# Patient Record
Sex: MALE | Race: OTHER | Employment: FULL TIME | URBAN - METROPOLITAN AREA
[De-identification: names, ages, dates, MRNs, and addresses within clinical notes are randomized per-mention and may not be internally consistent; named-entity substitution may affect disease eponyms.]

---

## 2017-01-18 ENCOUNTER — HOSPITAL ENCOUNTER (OUTPATIENT)
Age: 27
Discharge: HOME | End: 2017-01-18
Attending: EMERGENCY MEDICINE
Payer: COMMERCIAL

## 2017-01-18 VITALS
HEART RATE: 122 BPM | OXYGEN SATURATION: 96 % | TEMPERATURE: 101 F | DIASTOLIC BLOOD PRESSURE: 76 MMHG | HEIGHT: 67 IN | WEIGHT: 250 LBS | BODY MASS INDEX: 39.24 KG/M2 | SYSTOLIC BLOOD PRESSURE: 145 MMHG | RESPIRATION RATE: 18 BRPM

## 2017-01-18 DIAGNOSIS — J11.1 INFLUENZA: Primary | ICD-10-CM

## 2017-01-18 PROCEDURE — 99203 OFFICE O/P NEW LOW 30 MIN: CPT

## 2017-01-18 PROCEDURE — 99204 OFFICE O/P NEW MOD 45 MIN: CPT

## 2017-01-18 RX ORDER — BENZONATATE 200 MG/1
200 CAPSULE ORAL 3 TIMES DAILY PRN
Qty: 15 CAPSULE | Refills: 0 | Status: SHIPPED | OUTPATIENT
Start: 2017-01-18 | End: 2017-01-23

## 2017-01-18 RX ORDER — IBUPROFEN 600 MG/1
600 TABLET ORAL ONCE
Status: COMPLETED | OUTPATIENT
Start: 2017-01-18 | End: 2017-01-18

## 2017-01-18 RX ORDER — OSELTAMIVIR PHOSPHATE 75 MG/1
75 CAPSULE ORAL 2 TIMES DAILY
Qty: 10 CAPSULE | Refills: 0 | Status: SHIPPED | OUTPATIENT
Start: 2017-01-18 | End: 2017-01-23

## 2017-01-19 NOTE — ED NOTES
Temp 101. Motrin given on arival no to go to work tomorrow motrin every 6 hours rest wash hands cover your mouth with coughing. Go to the ed for chest pain shortness of breath increase coughing new or worse complaints.  No work for 24 hours after fever rody

## 2017-01-19 NOTE — ED PROVIDER NOTES
Patient Seen in: [facility] Immediate [facility] In [address] [place] [address]    History   Patient presents with:  Cough/URI    Stated Complaint: coughing, chest pain, vomiting, chills    HPI    Yesterday evening started with fevers, body aches, cough, headache scratchy external ear normal.   Left Ear: Tympanic membrane and external ear normal.   Nose: Nose normal.   Mouth/Throat: Uvula is midline, oropharynx is clear and moist and mucous membranes are normal.   Eyes: Conjunctivae and EOM are normal.   Neck: Neck supple.

## 2017-03-13 ENCOUNTER — HOSPITAL ENCOUNTER (OUTPATIENT)
Age: 27
Discharge: HOME | End: 2017-03-13
Attending: EMERGENCY MEDICINE
Payer: COMMERCIAL

## 2017-03-13 VITALS
WEIGHT: 250 LBS | HEIGHT: 67 IN | BODY MASS INDEX: 39.24 KG/M2 | HEART RATE: 85 BPM | DIASTOLIC BLOOD PRESSURE: 78 MMHG | RESPIRATION RATE: 16 BRPM | TEMPERATURE: 98 F | SYSTOLIC BLOOD PRESSURE: 127 MMHG | OXYGEN SATURATION: 98 %

## 2017-03-13 DIAGNOSIS — S61.219A FINGER LACERATION, INITIAL ENCOUNTER: Primary | ICD-10-CM

## 2017-03-13 PROCEDURE — 99212 OFFICE O/P EST SF 10 MIN: CPT

## 2017-03-13 NOTE — ED PROVIDER NOTES
Patient Seen in: [facility] Immediate Care In [address] [place] [address]    History   Patient presents with:  Upper Extremity Injury (musculoskeletal)    Stated Complaint: laceration     HPI  Patient sustained a laceration to his right fourth finger shortly prior t Pulmonary/Chest: Effort normal. No stridor. Abdominal: There is no tenderness. Musculoskeletal: Normal range of motion. He exhibits no edema. Right hand: He exhibits laceration.  He exhibits normal range of motion, no bony tenderness, normal two-

## 2017-08-01 ENCOUNTER — HOSPITAL ENCOUNTER (OUTPATIENT)
Age: 27
Discharge: HOME | End: 2017-08-01
Attending: PEDIATRICS
Payer: COMMERCIAL

## 2017-08-01 VITALS
RESPIRATION RATE: 18 BRPM | SYSTOLIC BLOOD PRESSURE: 130 MMHG | HEART RATE: 72 BPM | TEMPERATURE: 99 F | OXYGEN SATURATION: 98 % | DIASTOLIC BLOOD PRESSURE: 91 MMHG

## 2017-08-01 DIAGNOSIS — S86.111A GASTROCNEMIUS MUSCLE TEAR, RIGHT, INITIAL ENCOUNTER: Primary | ICD-10-CM

## 2017-08-01 PROCEDURE — 99213 OFFICE O/P EST LOW 20 MIN: CPT

## 2017-08-01 PROCEDURE — 99214 OFFICE O/P EST MOD 30 MIN: CPT

## 2017-08-01 RX ORDER — HYDROCODONE BITARTRATE AND ACETAMINOPHEN 5; 325 MG/1; MG/1
1-2 TABLET ORAL EVERY 4 HOURS PRN
Qty: 20 TABLET | Refills: 0 | Status: SHIPPED | OUTPATIENT
Start: 2017-08-01 | End: 2017-08-08

## 2017-08-01 RX ORDER — IBUPROFEN 600 MG/1
600 TABLET ORAL ONCE
Status: COMPLETED | OUTPATIENT
Start: 2017-08-01 | End: 2017-08-01

## 2017-08-02 NOTE — ED PROVIDER NOTES
Patient presents with:  Lower Extremity Injury (musculoskeletal)      HPI:     Dino Israel is a 31year old male who presents today with a chief complaint of pain in the right calf muscle.   Patient was at the swimming pool and jumped in the pool and whe [id number] [phone]    Schedule an appointment as soon as possible for a visit in 2 days

## 2017-08-02 NOTE — ED INITIAL ASSESSMENT (HPI)
Right calf injury today an hour ago. States he jumped off the edge at a pool and felt a snap in his right calf. Difficulty walking, painful to bear weight.

## 2025-07-25 ENCOUNTER — OFFICE VISIT (OUTPATIENT)
Facility: LOCATION | Age: 35
End: 2025-07-25

## 2025-07-25 VITALS
HEIGHT: 67 IN | DIASTOLIC BLOOD PRESSURE: 71 MMHG | WEIGHT: 235.19 LBS | BODY MASS INDEX: 36.91 KG/M2 | SYSTOLIC BLOOD PRESSURE: 105 MMHG | OXYGEN SATURATION: 98 % | HEART RATE: 79 BPM | RESPIRATION RATE: 16 BRPM

## 2025-07-25 DIAGNOSIS — Z76.89 RETURN TO WORK EVALUATION: Primary | ICD-10-CM

## 2025-07-25 DIAGNOSIS — S99.911A INJURY OF RIGHT ANKLE, INITIAL ENCOUNTER: ICD-10-CM

## 2025-07-25 PROCEDURE — 3074F SYST BP LT 130 MM HG: CPT | Performed by: NURSE PRACTITIONER

## 2025-07-25 PROCEDURE — 99202 OFFICE O/P NEW SF 15 MIN: CPT | Performed by: NURSE PRACTITIONER

## 2025-07-25 PROCEDURE — 3078F DIAST BP <80 MM HG: CPT | Performed by: NURSE PRACTITIONER

## 2025-07-25 PROCEDURE — 3008F BODY MASS INDEX DOCD: CPT | Performed by: NURSE PRACTITIONER

## 2025-07-25 RX ORDER — DEXTROAMPHETAMINE SACCHARATE, AMPHETAMINE ASPARTATE MONOHYDRATE, DEXTROAMPHETAMINE SULFATE AND AMPHETAMINE SULFATE 1.25; 1.25; 1.25; 1.25 MG/1; MG/1; MG/1; MG/1
5 CAPSULE, EXTENDED RELEASE ORAL EVERY MORNING
COMMUNITY

## 2025-07-25 NOTE — PROGRESS NOTES
OFFICE NOTE       The following individual(s) verbally consented to be recorded using ambient AI listening technology and understand that they can each withdraw their consent to this listening technology at any point by asking the clinician to turn off or pause the recording:    Patient name: Kamran Cooley  Additional names:  none       Patient ID: Kamran Cooley is a 34 year old male.  Today's Date: 07/25/25  Chief Complaint: Other (Needs work note for work/Out of work starting 7/2/2025)    HPI  History of Present Illness  Kamran Cooley is a 34 year old male who presents for a return to work note after a right ankle injury.    Approximately three weeks ago, he sustained a right ankle injury while descending stairs from his third-floor apartment. He missed a couple of steps while carrying trash, resulting in a fall and twisting of his right ankle. Initially, he experienced pain on the lateral side of the ankle, along with swelling and bruising. He did not seek medical evaluation or imaging at the time of the injury, as he had experienced similar injuries before and chose to stay home to recover.    He has been off work for three weeks due to the injury. He attempted to return to work about a week and a half ago but found it difficult to walk due to persistent soreness and pain, leading him to take additional time off. Currently, he reports no pain in the ankle and has been walking without issues. He works as a technician, performing both indoor and outdoor tasks.         Vitals:    07/25/25 0959   BP: 105/71   Pulse: 79   Resp: 16   SpO2: 98%   Weight: 235 lb 3.2 oz (106.7 kg)   Height: 5' 7\" (1.702 m)     body mass index is 36.84 kg/m².  BP Readings from Last 3 Encounters:   07/25/25 105/71   08/01/17 130/91   03/13/17 127/78     The ASCVD Risk score (Abby LAZARO, et al., 2019) failed to calculate for the following reasons:    The 2019 ASCVD risk score is only valid for ages 40 to 79  Results          Medications reviewed:  Current Medications[1]      Assessment & Plan    1. Return to work evaluation (Primary)  2. Injury of right ankle, initial encounter    Assessment & Plan  Right ankle sprain  Symptoms resolved, asymptomatic, ready to return to work.  - Provided return to work note effective today without restrictions.       Follow Up: As needed/if symptoms worsen or Return for AS NEEDED/IF SYMPTOMS WORSEN..         Objective/ Results:   Physical Exam   Physical Exam  GENERAL: Alert, cooperative, well developed, no acute distress.  HEENT: Normocephalic, normal oropharynx, moist mucous membranes.  CHEST: Clear to auscultation bilaterally, no wheezes, rhonchi, or crackles.  CARDIOVASCULAR: Normal heart rate and rhythm, S1 and S2 normal without murmurs.  ABDOMEN: Soft, non-tender, non-distended, without organomegaly, normal bowel sounds.  EXTREMITIES: No cyanosis or edema.  MUSCULOSKELETAL: No tenderness or pain in right ankle and foot.  NEUROLOGICAL: Cranial nerves grossly intact, moves all extremities without gross motor or sensory deficit.     Reviewed:    There are no active problems to display for this patient.     Allergies[2]   Short Social Hx on File[3]   Review of Systems    All other systems negative unless otherwise stated in ROS or HPI above.       DAINELLE Ford  Internal Medicine       Call office with any questions or seek emergency care if necessary.   Patient understands and agrees to follow directions and advice.      ----------------------------------------- PATIENT INSTRUCTIONS-----------------------------------------     Patient Instructions   VISIT SUMMARY:  You visited us today for a return to work note after injuring your right ankle three weeks ago. You had pain, swelling, and bruising initially, but now you are pain-free and walking without issues.    YOUR PLAN:  -RIGHT ANKLE SPRAIN: A right ankle sprain is an injury where the ligaments in your ankle are stretched or torn. Your  symptoms have resolved, and you are now asymptomatic. You are ready to return to work without any restrictions. A return to work note has been provided effective today.        Contains text generated by Bertin          The 21st Century Cures Act makes medical notes available to patients in the interest of transparency.  However, please be advised that this is a medical document.  It is intended as a peer to peer communication.  It is written in medical language and may contain abbreviations or verbiage that are technical and unfamiliar.  It may appear blunt or direct.  Medical documents are intended to carry relevant information, facts as evident, and the clinical opinion of the practitioner.            [1]   Current Outpatient Medications   Medication Sig Dispense Refill    amphetamine-dextroamphetamine ER 5 MG Oral Capsule SR 24 Hr Take 1 capsule (5 mg total) by mouth every morning. (Patient not taking: Reported on 7/25/2025)     [2] No Known Allergies  [3]   Social History  Socioeconomic History    Marital status: Single   Tobacco Use    Smoking status: Some Days    Smokeless tobacco: Never   Vaping Use    Vaping status: Never Used   Substance and Sexual Activity    Drug use: Not Currently     Social Drivers of Health     Food Insecurity: No Food Insecurity (7/25/2025)    NCSS - Food Insecurity     Worried About Running Out of Food in the Last Year: No     Ran Out of Food in the Last Year: No   Transportation Needs: No Transportation Needs (7/25/2025)    NCSS - Transportation     Lack of Transportation: No   Housing Stability: Not At Risk (7/25/2025)    NCSS - Housing/Utilities     Has Housing: Yes     Worried About Losing Housing: No     Unable to Get Utilities: No

## 2025-07-25 NOTE — PATIENT INSTRUCTIONS
VISIT SUMMARY:  You visited us today for a return to work note after injuring your right ankle three weeks ago. You had pain, swelling, and bruising initially, but now you are pain-free and walking without issues.    YOUR PLAN:  -RIGHT ANKLE SPRAIN: A right ankle sprain is an injury where the ligaments in your ankle are stretched or torn. Your symptoms have resolved, and you are now asymptomatic. You are ready to return to work without any restrictions. A return to work note has been provided effective today.        Contains text generated by Bertin

## (undated) NOTE — ED AVS SNAPSHOT
[facility] Immediate Care in [facility] [address]  [address] [facility]    Phone:  [phone]    Fax:  [phone]           Saulo Param   MRN: [id number]    Department:  [facility] Immediate Care in [address] [place] [address]   Date of Visit: presentation today. Symptoms may change or worsen over time, or new symptoms my develop. If you are not improving as expected see your doctor or return sooner than  we discussed.  If your are worsening or develop new symptoms or problems that concern you, [facility]. Follow-up care is at the discretion of that Physician.   If you need additional assistance selecting a physician, you may call the [place] [address] Physician Referral and Class Registration line at [phone] or f Additional Information       We are concerned for your overall well being:    - If you are a smoker or have smoked in the last 12 months, we encourage you to explore options for quitting.     - If you have concerns related to behavioral health issues or th

## (undated) NOTE — LETTER
[address]  [address] [facility]  Dept: [phone]  Dept Fax: [phone]  Loc: [phone]      January 18, 2017    Patient: Diana Curiel   Date of Visit: 1/18/2017       To Whom It May Concern:    Singh

## (undated) NOTE — LETTER
7/25/2025          To Whom It May Concern:    Kamran Cooley was seen today in my office. He may return to work as of 7/25/25 without restrictions.    If you require additional information please contact our office.        Sincerely,    DANIELLE Ford            Document generated by:  DANIELLE Ford

## (undated) NOTE — ED AVS SNAPSHOT
[facility] AND [facility] Immediate Care in [facility] [address].  [address] [facility]    Phone:  [phone]    Fax:  [phone]           Selvin Johansen   MRN: [id number]    Department:  [facility] AND [facility] Immediate Care in [address] [place] [address]   Date of Visit: physician may seek payment directly from you for amounts other than your deductible, co-payment, or co-insurance and for other services not covered under your health insurance plan.   Please contact your insurance company and physician's office to determine different from what your Primary Care doctor has instructed you - please continue to take your medications as instructed by your Primary Care doctor until you can check with your doctor. Please bring the medication list to your next doctor's appointment. coverage. Patient [address] is a Federal Navigator program that can help with your Affordable Care Act coverage, as well as all types of Medicaid plans.   To get signed up and covered, please call [phone] and ask to get set up for an insuran

## (undated) NOTE — LETTER
[address]  [place] [address]  Dept: [phone]  Dept Fax: [phone]  Loc: [phone]      August 1, 2017    Patient: Gerard Kawasaki   Date of Visit: 8/1/2017       To Whom It May Concern:    Ramses Garcia